# Patient Record
Sex: FEMALE | Race: WHITE | NOT HISPANIC OR LATINO
[De-identification: names, ages, dates, MRNs, and addresses within clinical notes are randomized per-mention and may not be internally consistent; named-entity substitution may affect disease eponyms.]

---

## 2017-04-28 VITALS
SYSTOLIC BLOOD PRESSURE: 92 MMHG | WEIGHT: 100 LBS | BODY MASS INDEX: 17.94 KG/M2 | HEIGHT: 62.5 IN | DIASTOLIC BLOOD PRESSURE: 60 MMHG

## 2018-03-20 ENCOUNTER — RX RENEWAL (OUTPATIENT)
Age: 23
End: 2018-03-20

## 2018-06-18 ENCOUNTER — APPOINTMENT (OUTPATIENT)
Dept: PEDIATRICS | Facility: CLINIC | Age: 23
End: 2018-06-18
Payer: MEDICAID

## 2018-06-18 VITALS
DIASTOLIC BLOOD PRESSURE: 60 MMHG | SYSTOLIC BLOOD PRESSURE: 120 MMHG | HEIGHT: 63 IN | WEIGHT: 103 LBS | BODY MASS INDEX: 18.25 KG/M2

## 2018-06-18 DIAGNOSIS — Z86.39 PERSONAL HISTORY OF OTHER ENDOCRINE, NUTRITIONAL AND METABOLIC DISEASE: ICD-10-CM

## 2018-06-18 DIAGNOSIS — R63.5 ABNORMAL WEIGHT GAIN: ICD-10-CM

## 2018-06-18 DIAGNOSIS — Z00.00 ENCOUNTER FOR GENERAL ADULT MEDICAL EXAMINATION W/OUT ABNORMAL FINDINGS: ICD-10-CM

## 2018-06-18 LAB — S PYO AG SPEC QL IA: NEGATIVE

## 2018-06-18 PROCEDURE — 81003 URINALYSIS AUTO W/O SCOPE: CPT | Mod: QW

## 2018-06-18 PROCEDURE — 87880 STREP A ASSAY W/OPTIC: CPT | Mod: QW

## 2018-06-18 PROCEDURE — 90734 MENACWYD/MENACWYCRM VACC IM: CPT

## 2018-06-18 PROCEDURE — 90471 IMMUNIZATION ADMIN: CPT

## 2018-06-18 PROCEDURE — 99395 PREV VISIT EST AGE 18-39: CPT | Mod: 25

## 2018-06-18 NOTE — DISCUSSION/SUMMARY
[Normal Growth] : growth [Normal Development] : development  [None] : No known medical problems [No Elimination Concerns] : elimination [No Feeding Concerns] : feeding [No Skin Concerns] : skin [Normal Sleep Pattern] : sleep [Physical Growth and Development] : physical growth and development [Social and Academic Competence] : social and academic competence [Emotional Well-Being] : emotional well-being [Risk Reduction] : risk reduction [Violence and Injury Prevention] : violence and injury prevention [No Medication Changes] : No medication changes [Patient] : patient [Mother] : mother [FreeTextEntry3] : RST negative, will send pcr to lab [FreeTextEntry1] : Shanice is relatively health with some intentional weight loss. Her examination is unremarkable except for bilateral injected and hypertrophic tonsils. She received a Menactra vaccination. Her RST was negative. A throat PCR is pending. I will see her back in 1 year. I order basic laboratory tests + Thyroid panel as she has hypothyroidism. 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

## 2018-06-18 NOTE — DEVELOPMENTAL MILESTONES
[0] : 2) Feeling down, depressed, or hopeless: Not at all (0) [Eats meals with family] : eats meals with family [Has famliy member/adult to turn to for help] : has family member/adult to turn to for help [Is permitted and is able to make independent decisions] : is permitted and is able to make independent decisions [Mother] : mother [Father] : father [NL] : normal [Eats regular meals including adequate fruits and vegetables] : eats regular meals including adequate fruits and vegetables [Drinks non-sweetened liquids] : drinks non-sweetened liquids [Calcium source] : has a source for calcium [Has concerns about body or appearance] : has concerns about body or appearance [Home is free of violence] : home is free of violence [Uses safety belts/safety equipment] : uses safety belts/safety equipment [Has peer relationships free of violence] : has peer relationships free of violence [Has ways to cope with stress] : has ways to cope with stress [Displays self-confidence] : displays self-confidence [[id number]] : 0 [Uses tobacco/alcohol/drugs] : does not use tobacco/alcohol/drugs [Impaired/Distracted driving] : no impaired/distracted driving [Sexually Active] : The patient is not sexually active [Has problems with sleep] : has no problems with sleep [Gets depressed, anxious, or irritable / has mood swings] : does not get depressed, anxious, or irritable / has no mood swings [Has thoughts about hurting self or considered suicide] : has no thoughts about hurting self or considered suicide [FreeTextEntry6] : College [FreeTextEntry8] : Attends College

## 2018-06-18 NOTE — HISTORY OF PRESENT ILLNESS
[Mother] : mother [Good] : good [Good Dental Hygiene] : Good [Up to Date] : Up to date [No Nutrition Concerns] : nutrition [No Sleep Concerns] : sleep [No Behavior Concerns] : behavior [No School Concerns] : school [No Developmental Concerns] : development [No Elimination Concerns] : elimination [Menarcheal] : The patient is menarcheal [Diverse, Healthy Diet] : her current diet is diverse and healthy [None] : No significant risk factors are identified [Parents] : receives care from parents [In Child's Home] : in the child's home [Excellent] : excellent [TB Risk] : no tuberculosis risk factors [FreeTextEntry5] : attends college [FreeTextEntry1] : Shanice is here for health maintenance, and also for an acute sore throat for about 2 days, no h/o fever.

## 2018-06-18 NOTE — REVIEW OF SYSTEMS
[Sore Throat] : sore throat [Change in Activity] : no change in activity [Fever] : no fever [Wgt Loss (___ Lbs)] : no recent weight loss [Eye Discharge] : no eye discharge [Redness] : no redness [Swollen Eyelids] : no swollen eyelids [Change in Vision] : no change in vision  [Nasal Stuffiness] : no nasal congestion [Earache] : no earache [Nosebleeds] : no epistaxis [Cyanosis] : no cyanosis [Edema] : no edema [Diaphoresis] : not diaphoretic [Exercise Intolerance] : no persistence of exercise intolerance [Chest Pain] : no chest pain or discomfort [Palpitations] : no palpitations [Tachypnea] : not tachypneic [Wheezing] : no wheezing [Cough] : no cough [Shortness of Breath] : no shortness of breath [Change in Appetite] : no change in appetite [Vomiting] : no vomiting [Diarrhea] : no diarrhea [Abdominal Pain] : no abdominal pain [Constipation] : no constipation [Fainting (Syncope)] : no fainting [Seizure] : no seizures [Headache] : no headache [Dizziness] : no dizziness [Limping] : no limping [Joint Pains] : no arthralgias [Joint Swelling] : no joint swelling [Back Pain] : ~T no back pain [Muscle Aches] : no muscle aches [Rash] : no rash [Insect Bites] : no insect bites [Skin Lesions] : no skin lesions [Bruising] : no tendency for easy bruising [Swollen Glands] : no lymphadenopathy [Sleep Disturbances] : ~T no sleep disturbances [Hyperactive] : no hyperactive behavior [Emotional Problems] : no ~T emotional problems [Change In Personality] : ~T no personality change [Dec Urine Output] : no oliguria [Urinary Frequency] : no change in urinary frequency [Pain During Urination (Dysuria)] : no dysuria [Vaginal Discharge] : no vaginal discharge [Pubertal Concerns] : no pubertal concerns [Delayed Menarche] : no delayed menarche [Irregular Periods] : no irregular periods [FreeTextEntry1] : acute sore throat without fever for 2 days

## 2018-06-18 NOTE — PHYSICAL EXAM
[General Appearance - Well Developed] : interactive [General Appearance - Well-Appearing] : well appearing [General Appearance - In No Acute Distress] : in no acute distress [Appearance Of Head] : the head was normocephalic [Sclera] : the conjunctiva were normal [Outer Ear] : the ears and nose were normal in appearance [Both Tympanic Membranes Were Examined] : both tympanic membranes were normal [Nasal Cavity] : the nasal mucosa and septum were normal [Examination Of The Oral Cavity] : the teeth, gums, and palate were normal [Respiration, Rhythm And Depth] : normal respiratory rhythm and effort [Auscultation Breath Sounds / Voice Sounds] : clear bilateral breath sounds [Heart Rate And Rhythm] : heart rate and rhythm were normal [Heart Sounds] : normal S1 and S2 [Murmurs] : no murmurs [Bowel Sounds] : normal bowel sounds [Abdomen Soft] : soft [Abdomen Tenderness] : non-tender [Abdominal Distention] : nondistended [Musculoskeletal Exam: Normal Movement Of All Extremities] : normal movements of all extremities [Motor Tone] : muscle strength and tone were normal [No Visual Abnormalities] : no visible abnormailities [Deep Tendon Reflexes (DTR)] : deep tendon reflexes were 2+ and symmetric [Generalized Lymph Node Enlargement] : no lymphadenopathy [Skin Color & Pigmentation] : normal skin color and pigmentation [] : no significant rash [Skin Lesions] : no skin lesions [Initial Inspection: Infant Active And Alert] : active and alert [External Female Genitalia] : normal external genitalia [Bud Stage ___] : the Bud stage for pubic hair development was [unfilled]  [FreeTextEntry1] : there is anterior cervical lymphadenopathy bilateral

## 2018-06-19 LAB — S PYO DNA THROAT QL NAA+PROBE: NOT DETECTED

## 2018-06-22 ENCOUNTER — APPOINTMENT (OUTPATIENT)
Dept: PEDIATRICS | Facility: CLINIC | Age: 23
End: 2018-06-22
Payer: MEDICAID

## 2018-06-22 VITALS — TEMPERATURE: 98.6 F

## 2018-06-22 DIAGNOSIS — Z87.09 PERSONAL HISTORY OF OTHER DISEASES OF THE RESPIRATORY SYSTEM: ICD-10-CM

## 2018-06-22 PROCEDURE — 99213 OFFICE O/P EST LOW 20 MIN: CPT

## 2018-06-22 RX ORDER — LEVOTHYROXINE SODIUM 0.07 MG/1
75 TABLET ORAL
Qty: 15 | Refills: 0 | Status: ACTIVE | COMMUNITY
Start: 2017-04-21

## 2018-06-22 NOTE — REVIEW OF SYSTEMS
[Malaise] : malaise [Headache] : headache [Nasal Congestion] : nasal congestion [Sore Throat] : sore throat [Cough] : cough [Congestion] : congestion [Negative] : Genitourinary

## 2018-06-23 NOTE — PHYSICAL EXAM
[Erythema] : erythema [Clear Rhinorrhea] : clear rhinorrhea [Inflamed Nasal Mucosa] : inflamed nasal mucosa [Erythematous Oropharynx] : erythematous oropharynx [NL] : warm [Tired appearing] : tired appearing

## 2018-10-18 ENCOUNTER — RX RENEWAL (OUTPATIENT)
Age: 23
End: 2018-10-18

## 2018-12-06 NOTE — HISTORY OF PRESENT ILLNESS
[de-identified] : seen for sore throat, the rapid strep test and throat PCR were negative. She now has a  cough, sinus pressure, nasal congestion, headache , one week (3) no apparent problem

## 2018-12-07 ENCOUNTER — APPOINTMENT (OUTPATIENT)
Dept: PEDIATRICS | Facility: CLINIC | Age: 23
End: 2018-12-07
Payer: MEDICAID

## 2018-12-07 VITALS — WEIGHT: 103 LBS | TEMPERATURE: 98.5 F

## 2018-12-07 DIAGNOSIS — J34.89 OTHER SPECIFIED DISORDERS OF NOSE AND NASAL SINUSES: ICD-10-CM

## 2018-12-07 DIAGNOSIS — Z87.09 PERSONAL HISTORY OF OTHER DISEASES OF THE RESPIRATORY SYSTEM: ICD-10-CM

## 2018-12-07 PROCEDURE — 90471 IMMUNIZATION ADMIN: CPT

## 2018-12-07 PROCEDURE — 99213 OFFICE O/P EST LOW 20 MIN: CPT | Mod: 25

## 2018-12-07 PROCEDURE — 90686 IIV4 VACC NO PRSV 0.5 ML IM: CPT | Mod: SL

## 2018-12-07 RX ORDER — ALPRAZOLAM 0.25 MG/1
0.25 TABLET ORAL
Qty: 30 | Refills: 0 | Status: DISCONTINUED | COMMUNITY
Start: 2018-03-16 | End: 2018-12-07

## 2018-12-07 RX ORDER — AMOXICILLIN AND CLAVULANATE POTASSIUM 875; 125 MG/1; MG/1
875-125 TABLET, COATED ORAL
Qty: 20 | Refills: 0 | Status: DISCONTINUED | COMMUNITY
Start: 2018-06-22 | End: 2018-12-07

## 2018-12-07 RX ORDER — MOMETASONE FUROATE 1 MG/G
0.1 CREAM TOPICAL TWICE DAILY
Qty: 1 | Refills: 0 | Status: DISCONTINUED | COMMUNITY
Start: 2018-12-07 | End: 2018-12-07

## 2018-12-07 RX ORDER — MINOCYCLINE HYDROCHLORIDE 50 MG/1
50 CAPSULE ORAL
Qty: 60 | Refills: 0 | Status: DISCONTINUED | COMMUNITY
Start: 2018-03-20 | End: 2018-12-07

## 2018-12-07 NOTE — DISCUSSION/SUMMARY
[FreeTextEntry1] : contact dermatitis and overlying tinea cruris inferior to the vestibule.\par Plan: mometasone cream bid and ketoconazole cream bid to affected area for 7 days

## 2018-12-07 NOTE — PHYSICAL EXAM
[Dry] : dry [Erythematous] : erythematous [de-identified] : Vaginal : there is dry, flaking and erythema just inferior to the vestibule

## 2018-12-07 NOTE — HISTORY OF PRESENT ILLNESS
[de-identified] : rash on vaginal region and Flu vx , h/o menses recently [FreeTextEntry6] : Chevy accompanied me in the room during examination\par \par patient was seen with a gown on\par

## 2019-01-11 ENCOUNTER — APPOINTMENT (OUTPATIENT)
Dept: PEDIATRICS | Facility: CLINIC | Age: 24
End: 2019-01-11
Payer: MEDICAID

## 2019-01-11 DIAGNOSIS — Z87.2 PERSONAL HISTORY OF DISEASES OF THE SKIN AND SUBCUTANEOUS TISSUE: ICD-10-CM

## 2019-01-11 DIAGNOSIS — Z86.19 PERSONAL HISTORY OF OTHER INFECTIOUS AND PARASITIC DISEASES: ICD-10-CM

## 2019-01-11 LAB — S PYO AG SPEC QL IA: NEGATIVE

## 2019-01-11 PROCEDURE — 87880 STREP A ASSAY W/OPTIC: CPT | Mod: QW

## 2019-01-11 PROCEDURE — 99213 OFFICE O/P EST LOW 20 MIN: CPT

## 2019-01-11 RX ORDER — FLUTICASONE PROPIONATE 0.5 MG/G
0.05 CREAM TOPICAL
Qty: 1 | Refills: 0 | Status: DISCONTINUED | COMMUNITY
Start: 2018-12-07 | End: 2019-01-11

## 2019-01-11 RX ORDER — KETOCONAZOLE 20 MG/G
2 CREAM TOPICAL TWICE DAILY
Qty: 1 | Refills: 0 | Status: DISCONTINUED | COMMUNITY
Start: 2018-12-07 | End: 2019-01-11

## 2019-01-11 NOTE — DISCUSSION/SUMMARY
[FreeTextEntry1] : no positive findings on examination--pharyngitis\par will get ENT consult to look at the nasopharynx and vocal cords \par RST- throat pcr pending

## 2019-01-11 NOTE — REVIEW OF SYSTEMS
[Sore Throat] : sore throat [Negative] : Genitourinary [FreeTextEntry1] : feels as if there is mucus just after swallowing

## 2019-01-11 NOTE — HISTORY OF PRESENT ILLNESS
[FreeTextEntry6] : mucus , congested , feels in the back of throat, lump when swallowing , since October 2018.

## 2019-01-14 LAB — S PYO DNA THROAT QL NAA+PROBE: NOT DETECTED

## 2019-06-08 ENCOUNTER — RX RENEWAL (OUTPATIENT)
Age: 24
End: 2019-06-08

## 2019-10-28 DIAGNOSIS — Z87.09 PERSONAL HISTORY OF OTHER DISEASES OF THE RESPIRATORY SYSTEM: ICD-10-CM

## 2019-10-29 ENCOUNTER — APPOINTMENT (OUTPATIENT)
Dept: PEDIATRICS | Facility: CLINIC | Age: 24
End: 2019-10-29
Payer: MEDICAID

## 2019-10-29 VITALS — WEIGHT: 96.5 LBS | TEMPERATURE: 98.7 F

## 2019-10-29 PROCEDURE — 99213 OFFICE O/P EST LOW 20 MIN: CPT

## 2019-10-29 RX ORDER — DOXYCYCLINE 100 MG/1
100 CAPSULE ORAL
Qty: 30 | Refills: 0 | Status: DISCONTINUED | COMMUNITY
Start: 2018-03-26 | End: 2019-10-29

## 2019-10-29 RX ORDER — DOXYCYCLINE HYCLATE 100 MG/1
100 CAPSULE ORAL
Qty: 30 | Refills: 0 | Status: DISCONTINUED | COMMUNITY
Start: 2019-04-30

## 2019-10-29 RX ORDER — CLINDAMYCIN PHOSPHATE 1 G/10ML
1 GEL TOPICAL
Qty: 30 | Refills: 0 | Status: DISCONTINUED | COMMUNITY
Start: 2019-05-19

## 2019-10-29 RX ORDER — DROSPIRENONE AND ETHINYL ESTRADIOL TABLETS 0.02-3(28)
3-0.02 KIT ORAL
Qty: 84 | Refills: 0 | Status: DISCONTINUED | COMMUNITY
Start: 2017-09-28 | End: 2019-10-29

## 2019-10-29 RX ORDER — FLUTICASONE PROPIONATE 50 UG/1
50 SPRAY, METERED NASAL
Qty: 16 | Refills: 0 | Status: DISCONTINUED | COMMUNITY
Start: 2019-05-08

## 2019-10-30 ENCOUNTER — RESULT CHARGE (OUTPATIENT)
Age: 24
End: 2019-10-30

## 2019-10-30 LAB
DATE COLLECTED: NORMAL
GI PCR PANEL, STOOL: NORMAL
HEMOCCULT SP1 STL QL: NEGATIVE

## 2019-10-30 NOTE — DISCUSSION/SUMMARY
[FreeTextEntry1] : 1-2 weeks of intermittent lower abdominal pain, cramping, some diarrhea, no blood, some mucus. \par Family h/o IBS\par plan: see orders  below\par I recommended a high fiber diet, + Benefiber daily \par Probiotic \par f/u labs \par probable GI referral

## 2019-10-30 NOTE — HISTORY OF PRESENT ILLNESS
[FreeTextEntry6] : first episode of abdominal pain, 4 weeks ago,  , intermittent, low middle abdomen, gets abdominal pain after eating,\par 3 days ago, diarrhea, no vomiting, no fever, no blood, but mucus, eating less due to abdominal pain, loss of appetite, daily basis, nausea , no refulx ,  IBS in family history\par weight loss 102 down to ( 5-8 lbs)\par h/o anxiety \par after eating, she will stool and has discomfort

## 2019-10-30 NOTE — REVIEW OF SYSTEMS
[Malaise] : malaise [Diarrhea] : diarrhea [Abdominal Pain] : abdominal pain [Negative] : Genitourinary [FreeTextEntry1] : nausea, loss of appetite, 5-8 lb weight loss

## 2019-10-31 LAB
C DIFF TOX GENS STL QL NAA+PROBE: NORMAL
CDIFF BY PCR: DETECTED

## 2019-10-31 RX ORDER — VANCOMYCIN HYDROCHLORIDE 125 MG/1
125 CAPSULE ORAL 4 TIMES DAILY
Qty: 40 | Refills: 0 | Status: DISCONTINUED | COMMUNITY
Start: 2019-10-31 | End: 2019-10-31

## 2019-11-01 LAB
ALBUMIN SERPL ELPH-MCNC: 4 G/DL
ALP BLD-CCNC: 64 U/L
ALT SERPL-CCNC: 11 U/L
ANION GAP SERPL CALC-SCNC: 16 MMOL/L
AST SERPL-CCNC: 14 U/L
BASOPHILS # BLD AUTO: 0.07 K/UL
BASOPHILS NFR BLD AUTO: 0.9 %
BILIRUB SERPL-MCNC: 0.2 MG/DL
BUN SERPL-MCNC: 7 MG/DL
CALCIUM SERPL-MCNC: 9.7 MG/DL
CHLORIDE SERPL-SCNC: 103 MMOL/L
CO2 SERPL-SCNC: 20 MMOL/L
CREAT SERPL-MCNC: 0.51 MG/DL
CRP SERPL-MCNC: 0.44 MG/DL
ENDOMYSIUM IGA SER QL: NEGATIVE
ENDOMYSIUM IGA TITR SER: NORMAL
EOSINOPHIL # BLD AUTO: 0.04 K/UL
EOSINOPHIL NFR BLD AUTO: 0.5 %
ERYTHROCYTE [SEDIMENTATION RATE] IN BLOOD BY WESTERGREN METHOD: 31 MM/HR
HCT VFR BLD CALC: 40.4 %
HGB BLD-MCNC: 13.1 G/DL
IMM GRANULOCYTES NFR BLD AUTO: 0.3 %
LYMPHOCYTES # BLD AUTO: 2.69 K/UL
LYMPHOCYTES NFR BLD AUTO: 34.7 %
MAN DIFF?: NORMAL
MCHC RBC-ENTMCNC: 28.8 PG
MCHC RBC-ENTMCNC: 32.4 GM/DL
MCV RBC AUTO: 88.8 FL
MONOCYTES # BLD AUTO: 0.48 K/UL
MONOCYTES NFR BLD AUTO: 6.2 %
NEUTROPHILS # BLD AUTO: 4.45 K/UL
NEUTROPHILS NFR BLD AUTO: 57.4 %
PLATELET # BLD AUTO: 380 K/UL
POTASSIUM SERPL-SCNC: 4.5 MMOL/L
PROT SERPL-MCNC: 6.9 G/DL
RBC # BLD: 4.55 M/UL
RBC # FLD: 12.2 %
SODIUM SERPL-SCNC: 138 MMOL/L
WBC # FLD AUTO: 7.75 K/UL

## 2019-11-01 RX ORDER — VANCOMYCIN HYDROCHLORIDE 125 MG/1
125 CAPSULE ORAL
Qty: 40 | Refills: 0 | Status: DISCONTINUED | COMMUNITY
Start: 2019-11-01 | End: 2019-11-01

## 2019-11-01 RX ORDER — VANCOMYCIN HYDROCHLORIDE 50 MG/ML
50 KIT ORAL 4 TIMES DAILY
Qty: 1 | Refills: 0 | Status: DISCONTINUED | COMMUNITY
Start: 2019-10-31 | End: 2019-11-01

## 2019-11-04 LAB
GLIADIN IGA SER QL: 5.8 UNITS
GLIADIN IGG SER QL: <5 UNITS
GLIADIN PEPTIDE IGA SER-ACNC: NEGATIVE
GLIADIN PEPTIDE IGG SER-ACNC: NEGATIVE
TTG IGA SER IA-ACNC: <1.2 U/ML
TTG IGA SER-ACNC: NEGATIVE
TTG IGG SER IA-ACNC: 1.2 U/ML
TTG IGG SER IA-ACNC: NEGATIVE

## 2019-11-06 ENCOUNTER — APPOINTMENT (OUTPATIENT)
Dept: PEDIATRICS | Facility: CLINIC | Age: 24
End: 2019-11-06
Payer: MEDICAID

## 2019-11-06 VITALS — WEIGHT: 98.5 LBS | TEMPERATURE: 98.8 F

## 2019-11-06 PROCEDURE — 99213 OFFICE O/P EST LOW 20 MIN: CPT

## 2019-11-07 DIAGNOSIS — Z87.09 PERSONAL HISTORY OF OTHER DISEASES OF THE RESPIRATORY SYSTEM: ICD-10-CM

## 2019-11-07 LAB — DEPRECATED O AND P PREP STL: NORMAL

## 2019-11-07 NOTE — HISTORY OF PRESENT ILLNESS
[FreeTextEntry6] : Pt is here for a sore throat. She is presently on vancomycin for C.diff.  There is no fever

## 2019-11-07 NOTE — PHYSICAL EXAM
[Supple] : supple [NL] : no abnormal lymph nodes palpated [FreeTextEntry5] : Conjunctiva and sclera are clear bilaterally  [de-identified] : The throat is minimally red no pus  [de-identified] : Conjunctiva and sclera are clear bilaterally

## 2019-11-15 ENCOUNTER — APPOINTMENT (OUTPATIENT)
Dept: PEDIATRICS | Facility: CLINIC | Age: 24
End: 2019-11-15
Payer: MEDICAID

## 2019-11-15 DIAGNOSIS — Z87.2 PERSONAL HISTORY OF DISEASES OF THE SKIN AND SUBCUTANEOUS TISSUE: ICD-10-CM

## 2019-11-15 DIAGNOSIS — L90.6 STRIAE ATROPHICAE: ICD-10-CM

## 2019-11-15 DIAGNOSIS — M40.00 POSTURAL KYPHOSIS, SITE UNSPECIFIED: ICD-10-CM

## 2019-11-15 DIAGNOSIS — A04.72 ENTEROCOLITIS DUE TO CLOSTRIDIUM DIFFICILE, NOT SPECIFIED AS RECURRENT: ICD-10-CM

## 2019-11-15 DIAGNOSIS — R10.30 LOWER ABDOMINAL PAIN, UNSPECIFIED: ICD-10-CM

## 2019-11-15 DIAGNOSIS — N91.1 SECONDARY AMENORRHEA: ICD-10-CM

## 2019-11-15 DIAGNOSIS — Z23 ENCOUNTER FOR IMMUNIZATION: ICD-10-CM

## 2019-11-15 PROCEDURE — 90686 IIV4 VACC NO PRSV 0.5 ML IM: CPT | Mod: SL

## 2019-11-15 PROCEDURE — 90471 IMMUNIZATION ADMIN: CPT

## 2019-11-22 ENCOUNTER — RX RENEWAL (OUTPATIENT)
Age: 24
End: 2019-11-22

## 2020-02-14 ENCOUNTER — RX RENEWAL (OUTPATIENT)
Age: 25
End: 2020-02-14

## 2020-05-06 ENCOUNTER — RX RENEWAL (OUTPATIENT)
Age: 25
End: 2020-05-06

## 2020-07-30 ENCOUNTER — RX RENEWAL (OUTPATIENT)
Age: 25
End: 2020-07-30

## 2020-10-16 ENCOUNTER — RX RENEWAL (OUTPATIENT)
Age: 25
End: 2020-10-16

## 2021-01-14 ENCOUNTER — RX RENEWAL (OUTPATIENT)
Age: 26
End: 2021-01-14

## 2021-04-09 ENCOUNTER — RX RENEWAL (OUTPATIENT)
Age: 26
End: 2021-04-09

## 2021-04-11 ENCOUNTER — RX RENEWAL (OUTPATIENT)
Age: 26
End: 2021-04-11

## 2021-04-11 RX ORDER — DROSPIRENONE AND ETHINYL ESTRADIOL 0.02-3(28)
3-0.02 KIT ORAL DAILY
Qty: 3 | Refills: 3 | Status: ACTIVE | COMMUNITY
Start: 2019-06-08 | End: 1900-01-01

## 2022-03-04 ENCOUNTER — RX RENEWAL (OUTPATIENT)
Age: 27
End: 2022-03-04

## 2022-03-04 DIAGNOSIS — N94.6 DYSMENORRHEA, UNSPECIFIED: ICD-10-CM

## 2023-02-02 ENCOUNTER — RX RENEWAL (OUTPATIENT)
Age: 28
End: 2023-02-02

## 2023-12-19 ENCOUNTER — RX RENEWAL (OUTPATIENT)
Age: 28
End: 2023-12-19

## 2023-12-19 RX ORDER — DROSPIRENONE AND ETHINYL ESTRADIOL 0.02-3(28)
3-0.02 KIT ORAL
Qty: 84 | Refills: 3 | Status: ACTIVE | COMMUNITY
Start: 2022-03-04 | End: 1900-01-01

## 2024-10-15 ENCOUNTER — APPOINTMENT (OUTPATIENT)
Dept: ENDOCRINOLOGY | Facility: CLINIC | Age: 29
End: 2024-10-15

## 2025-08-09 ENCOUNTER — NON-APPOINTMENT (OUTPATIENT)
Age: 30
End: 2025-08-09